# Patient Record
Sex: FEMALE | Race: ASIAN | URBAN - METROPOLITAN AREA
[De-identification: names, ages, dates, MRNs, and addresses within clinical notes are randomized per-mention and may not be internally consistent; named-entity substitution may affect disease eponyms.]

---

## 2023-03-28 ENCOUNTER — APPOINTMENT (RX ONLY)
Dept: URBAN - METROPOLITAN AREA CLINIC 329 | Facility: CLINIC | Age: 28
Setting detail: DERMATOLOGY
End: 2023-03-28

## 2023-03-28 DIAGNOSIS — L81.9 DISORDER OF PIGMENTATION, UNSPECIFIED: ICD-10-CM

## 2023-03-28 DIAGNOSIS — B08.1 MOLLUSCUM CONTAGIOSUM: ICD-10-CM

## 2023-03-28 PROCEDURE — ? PRESCRIPTION

## 2023-03-28 PROCEDURE — ? PRESCRIPTION MEDICATION MANAGEMENT

## 2023-03-28 PROCEDURE — ? MEDICATION COUNSELING

## 2023-03-28 PROCEDURE — ? ADDITIONAL NOTES

## 2023-03-28 PROCEDURE — 99203 OFFICE O/P NEW LOW 30 MIN: CPT

## 2023-03-28 PROCEDURE — ? COUNSELING

## 2023-03-28 RX ORDER — TRETIONIN 0.25 MG/G
CREAM TOPICAL
Qty: 45 | Refills: 0 | Status: ERX | COMMUNITY
Start: 2023-03-28

## 2023-03-28 RX ADMIN — TRETIONIN: 0.25 CREAM TOPICAL at 00:00

## 2023-03-28 ASSESSMENT — LOCATION DETAILED DESCRIPTION DERM
LOCATION DETAILED: RIGHT LATERAL INFERIOR EYELID
LOCATION DETAILED: LEFT INFERIOR CENTRAL MALAR CHEEK
LOCATION DETAILED: LEFT LATERAL INFERIOR EYELID

## 2023-03-28 ASSESSMENT — LOCATION ZONE DERM
LOCATION ZONE: EYELID
LOCATION ZONE: FACE

## 2023-03-28 ASSESSMENT — LOCATION SIMPLE DESCRIPTION DERM
LOCATION SIMPLE: LEFT INFERIOR EYELID
LOCATION SIMPLE: LEFT CHEEK
LOCATION SIMPLE: RIGHT INFERIOR EYELID

## 2023-03-28 NOTE — PROCEDURE: PRESCRIPTION MEDICATION MANAGEMENT
Initiate Treatment: Triamcinalone- apply a small amount to molluscum bumps daily. Be very careful around the eyes. Discontinue medication once the molluscum become irritated
Detail Level: Zone
Render In Strict Bullet Format?: No

## 2023-03-28 NOTE — PROCEDURE: MEDICATION COUNSELING
Xeltiaraz Pregnancy And Lactation Text: This medication is Pregnancy Category D and is not considered safe during pregnancy.  The risk during breast feeding is also uncertain.

## 2023-03-28 NOTE — PROCEDURE: MEDICATION COUNSELING
ambulate 150 feet with RW independently in 3 sessions Eucrisa Counseling: Patient may experience a mild burning sensation during topical application. Eucrisa is not approved in children less than 3 months of age.

## 2023-03-28 NOTE — PROCEDURE: ADDITIONAL NOTES
Additional Notes: Patient will try EltaMD sunscreen and vitamin C
Render Risk Assessment In Note?: no
Detail Level: Simple

## 2024-05-08 ENCOUNTER — TRANSCRIBE ORDERS (OUTPATIENT)
Dept: SCHEDULING | Age: 29
End: 2024-05-08

## 2024-05-08 ENCOUNTER — APPOINTMENT (OUTPATIENT)
Dept: ULTRASOUND IMAGING | Age: 29
End: 2024-05-08
Payer: COMMERCIAL

## 2024-05-08 ENCOUNTER — HOSPITAL ENCOUNTER (EMERGENCY)
Age: 29
Discharge: HOME | End: 2024-05-08
Payer: COMMERCIAL

## 2024-05-08 VITALS
SYSTOLIC BLOOD PRESSURE: 110 MMHG | WEIGHT: 174.3 LBS | RESPIRATION RATE: 16 BRPM | TEMPERATURE: 98.8 F | DIASTOLIC BLOOD PRESSURE: 80 MMHG | BODY MASS INDEX: 27.36 KG/M2 | HEART RATE: 86 BPM | OXYGEN SATURATION: 98 % | HEIGHT: 67 IN

## 2024-05-08 DIAGNOSIS — M79.605 LEFT LEG PAIN: Primary | ICD-10-CM

## 2024-05-08 PROCEDURE — 99284 EMERGENCY DEPT VISIT MOD MDM: CPT

## 2024-05-08 PROCEDURE — 93971 EXTREMITY STUDY: CPT

## 2024-05-08 ASSESSMENT — LIFESTYLE VARIABLES
HOW MANY STANDARD DRINKS CONTAINING ALCOHOL DO YOU HAVE ON A TYPICAL DAY: PATIENT DOES NOT DRINK
HOW OFTEN DO YOU HAVE A DRINK CONTAINING ALCOHOL: NEVER

## 2024-05-08 ASSESSMENT — ENCOUNTER SYMPTOMS
COUGH: 0
VOMITING: 0
ABDOMINAL PAIN: 0
PHOTOPHOBIA: 0
FACIAL SWELLING: 0
SHORTNESS OF BREATH: 0
TROUBLE SWALLOWING: 0

## 2024-05-08 ASSESSMENT — PAIN DESCRIPTION - ORIENTATION: ORIENTATION: LEFT

## 2024-05-08 ASSESSMENT — PAIN - FUNCTIONAL ASSESSMENT: PAIN_FUNCTIONAL_ASSESSMENT: 0-10

## 2024-05-08 ASSESSMENT — PAIN DESCRIPTION - LOCATION: LOCATION: LEG

## 2024-05-08 NOTE — ED PROVIDER NOTES
Ear: External ear normal.      Nose: Nose normal.      Mouth/Throat:      Mouth: Mucous membranes are moist.   Eyes:      General: No scleral icterus.        Right eye: No discharge.         Left eye: No discharge.      Conjunctiva/sclera: Conjunctivae normal.   Cardiovascular:      Rate and Rhythm: Normal rate and regular rhythm.      Pulses: Normal pulses.      Heart sounds: Normal heart sounds.   Pulmonary:      Effort: Pulmonary effort is normal. No respiratory distress.      Breath sounds: Normal breath sounds. No stridor. No wheezing, rhonchi or rales.   Abdominal:      General: There is no distension.      Palpations: There is no mass.      Tenderness: There is no abdominal tenderness. There is no right CVA tenderness, left CVA tenderness, guarding or rebound.      Hernia: No hernia is present.      Comments: Gravid uterus.  No abdominal tenderness   Musculoskeletal:         General: Normal range of motion.      Cervical back: Normal range of motion and neck supple.      Comments: Trace left lower extremity edema compared with the right.  Tenderness in the calf belly of the left lower extremity.  Bilateral 2+ dorsalis pedis pulse in bilateral lower extremities.  Full range of motion of all joints of the left lower extremity including hip, knee, ankle.  No erythema or warmth.  Negative Homans' sign.  No palpable cords.  Ambulatory   Skin:     General: Skin is warm and dry.      Capillary Refill: Capillary refill takes less than 2 seconds.      Coloration: Skin is not jaundiced.   Neurological:      General: No focal deficit present.      Mental Status: She is alert. Mental status is at baseline.        Procedures     Procedures    Orders Placed This Encounter   Procedures    Vascular duplex lower extremity venous left        Medications given during this emergency department visit:  Medications - No data to display    New Prescriptions    No medications on file        History reviewed. No pertinent past

## 2024-05-08 NOTE — ED TRIAGE NOTES
Patient to triage with c/o left sided calf pain. Pt was sent by OBGYN for possible blood clot. Pt is currently 38 weeks pregnant.

## 2024-05-08 NOTE — DISCHARGE INSTRUCTIONS
Your ultrasound was negative today.  Please call your OB/GYN to discuss further management.    As we discussed, I did not find a life threatening cause of your symptoms today. However, THAT DOES NOT MEAN IT COULD NOT DEVELOP. If you develop ANY new or worsening symptoms, it is critical that you return for re-evaluation. This includes any symptoms that are concerning to you, especially symptoms such as inability to bear weight, fevers, redness.  If you do not return for re-evaluation, you risk serious complications, including death.

## 2024-05-10 ENCOUNTER — HOSPITAL ENCOUNTER (OUTPATIENT)
Age: 29
Discharge: HOME | End: 2024-05-11
Attending: OBSTETRICS & GYNECOLOGY | Admitting: OBSTETRICS & GYNECOLOGY
Payer: COMMERCIAL

## 2024-05-11 VITALS
TEMPERATURE: 97.2 F | OXYGEN SATURATION: 99 % | HEART RATE: 90 BPM | DIASTOLIC BLOOD PRESSURE: 71 MMHG | RESPIRATION RATE: 17 BRPM | SYSTOLIC BLOOD PRESSURE: 108 MMHG

## 2024-05-11 PROCEDURE — 6370000000 HC RX 637 (ALT 250 FOR IP): Performed by: OBSTETRICS & GYNECOLOGY

## 2024-05-11 PROCEDURE — 99285 EMERGENCY DEPT VISIT HI MDM: CPT

## 2024-05-11 RX ORDER — CYCLOBENZAPRINE HCL 10 MG
10 TABLET ORAL 2 TIMES DAILY PRN
Qty: 6 TABLET | Refills: 0 | Status: ON HOLD | OUTPATIENT
Start: 2024-05-11

## 2024-05-11 RX ORDER — CYCLOBENZAPRINE HCL 10 MG
10 TABLET ORAL 3 TIMES DAILY PRN
Status: DISCONTINUED | OUTPATIENT
Start: 2024-05-11 | End: 2024-05-11 | Stop reason: HOSPADM

## 2024-05-11 RX ADMIN — CYCLOBENZAPRINE 10 MG: 10 TABLET, FILM COATED ORAL at 02:10

## 2024-05-11 NOTE — PROGRESS NOTES
Pt to L&D with reports of ctx and back pain. This began at yesterday. Pt denies vaginal bleeding, loss of fluid, H/A, and blurry vision. Pt reports good fetal movement. EFM and TOCO applied to a soft, non-tender abd. MD notified.

## 2024-05-11 NOTE — DISCHARGE INSTRUCTIONS
make sure to drink plenty of water.  Follow-up care is a key part of your treatment and safety. Be sure to make and go to all appointments, and call your doctor if you are having problems. It's also a good idea to know your test results and keep a list of the medicines you take.  How can you care for yourself at home?  Make sure you go to your prenatal appointments. At each visit, your doctor will check your blood pressure and weight. Your doctor will also listen for a fetal heartbeat and measure the size of the uterus.  Drink plenty of fluids. Dehydration can cause contractions. If you have kidney, heart, or liver disease and have to limit fluids, talk with your doctor before you increase the amount of fluids you drink.  Tell your doctor right away if you notice any symptoms of an infection, such as:  Burning when you urinate.  A frequent need to urinate without being able to pass much urine.  A foul-smelling discharge from your vagina.  Vaginal itching.  Unexplained fever.  Unusual pain or soreness in your uterus or lower belly.  Avoid foods that may be harmful.  Don't eat raw meat, deli meat, raw seafood, or raw eggs.  Avoid soft cheese and unpasteurized dairy, like Brie and blue cheese.  Avoid fish that are high in mercury. These include shark, swordfish, kenrick mackerel, marlin, orange roughy, and bigeye tuna, as well as tilefish from the [place].  If you smoke or vape, quit or cut back as much as you can. Talk to your doctor if you need help quitting.  If you use alcohol, marijuana, or other drugs, quit or cut back as much as you can. It's safest not to use them at all. Talk to your doctor if you need help quitting.  Follow your doctor's directions about activity. Your doctor will let you know how much exercise you can do.  Ask your doctor if you can have sex. If you are at risk for early labor, your doctor may ask you to not have sex.  Take care to avoid falling. Changes in your body during pregnancy,  license by [employer]. If you have questions about a medical condition or this instruction, always ask your healthcare professional. [employer] disclaims any warranty or liability for your use of this information.

## 2024-05-11 NOTE — PROGRESS NOTES
Discharge orders received from MD. Discharge paperwork printed and gone over with pt. All questions answered at this time. EFM and TOCO removed from soft, non-tender abd. Pt ambulated off of unit w/ no difficulty.

## 2024-05-11 NOTE — H&P
History & Physical    Name: Nancy Puga MRN: [id number]  SSN: [id number]    YOB: 1995  Age: 28 y.o.  Sex: female      Subjective:     Reason for Triage visit:  38w3d and contractions    History of Present Illness: Ms. Puga is a 28 y.o.  female with an estimated gestational age of 38w3d with Estimated Date of Delivery: 24. Patient states that she seems to be having back labor. She isn't hurting in her stomach but is having cramping in her back intermittently.  She has episodes of being pain free between contractions.  No VB, no LOF. Good FM. Pregnancy has been  uncomplicated.  Patient denies chest pain, fever, headache , nausea and vomiting, pelvic pressure, right upper quadrant pain  , shortness of breath, swelling, vaginal bleeding , vaginal leaking of fluid , and visual disturbances.    OB History    Para Term  AB Living   1             SAB IAB Ectopic Molar Multiple Live Births                    # Outcome Date GA Lbr James/2nd Weight Sex Delivery Anes PTL Lv   1 Current              History reviewed. No pertinent past medical history.  History reviewed. No pertinent surgical history.  Social History     Occupational History    Not on file   Tobacco Use    Smoking status: Never    Smokeless tobacco: Never   Substance and Sexual Activity    Alcohol use: Not on file    Drug use: Not on file    Sexual activity: Not on file      History reviewed. No pertinent family history.    No Known Allergies  Prior to Admission medications    Not on File        Review of Systems:  Complete review of systems performed.  Those not specifically mentioned in the HPI are either negative are non related to this patient encounter.    Objective:     Vitals:    Vitals:    24 0012   BP: 108/71   Pulse: 90   Resp: 17   Temp: 97.2 °F (36.2 °C)   TempSrc: Oral   SpO2: 99%      Temp (24hrs), Av.2 °F (36.2 °C), Min:97.2 °F (36.2 °C), Max:97.2 °F (36.2 °C)    BP  Min: 108/71  Max: 108/71        Physical Exam:  Heart: RRR  Lungs: cta bl  Adb: soft, nt nd, gravid  Ext: no edema noted  CVX: 2/80/-2 ( unchanged after 1.5 hours)  Membranes:  Intact  Uterine Activity:  Frequency: Every 3-7 minutes   Fetal Heart Rate:  Reactive         Assessment and Plan:   38w3d with initial complaints of contractions but cervix unchanged after walking for over 1 hour.  Likely latent labor.  Will provide flexeril for pain control.  Precautions for active labor given      Precautions given.  Patient was told to return to LD immediately if she experiences contractions in a pattern, loss of fluids, vaginal bleeding or decreased Fetal movement.      All lab results for the last 24 hours reviewed.

## 2024-05-11 NOTE — PROGRESS NOTES
Verbal orders from MD to allow pt to walk for an hour and then recheck SVE. Pt removed from monitor at this time.

## 2024-05-12 ENCOUNTER — ANESTHESIA EVENT (OUTPATIENT)
Dept: LABOR AND DELIVERY | Age: 29
End: 2024-05-12
Payer: COMMERCIAL

## 2024-05-12 ENCOUNTER — HOSPITAL ENCOUNTER (INPATIENT)
Age: 29
LOS: 3 days | Discharge: HOME | End: 2024-05-15
Attending: OBSTETRICS & GYNECOLOGY | Admitting: OBSTETRICS & GYNECOLOGY
Payer: COMMERCIAL

## 2024-05-12 ENCOUNTER — ANESTHESIA (OUTPATIENT)
Dept: LABOR AND DELIVERY | Age: 29
End: 2024-05-12
Payer: COMMERCIAL

## 2024-05-12 DIAGNOSIS — R52 POSTPARTUM PAIN: Primary | ICD-10-CM

## 2024-05-12 PROBLEM — Z37.9 NORMAL LABOR: Status: ACTIVE | Noted: 2024-05-12

## 2024-05-12 LAB
ABO + RH BLD: NORMAL
BLOOD GROUP ANTIBODIES SERPL: NORMAL
ERYTHROCYTE [DISTWIDTH] IN BLOOD BY AUTOMATED COUNT: 15 % (ref 11.9–14.6)
HCT VFR BLD AUTO: 42.6 % (ref 35.8–46.3)
HGB BLD-MCNC: 14.2 G/DL (ref 11.7–15.4)
MCH RBC QN AUTO: 28.7 PG (ref 26.1–32.9)
MCHC RBC AUTO-ENTMCNC: 33.3 G/DL (ref 31.4–35)
MCV RBC AUTO: 86.1 FL (ref 82–102)
NRBC # BLD: 0 K/UL (ref 0–0.2)
PLATELET # BLD AUTO: 243 K/UL (ref 150–450)
PMV BLD AUTO: 10.1 FL (ref 9.4–12.3)
RBC # BLD AUTO: 4.95 M/UL (ref 4.05–5.2)
SPECIMEN EXP DATE BLD: NORMAL
T PALLIDUM AB SER QL IA: NONREACTIVE
WBC # BLD AUTO: 12.2 K/UL (ref 4.3–11.1)

## 2024-05-12 PROCEDURE — 2500000003 HC RX 250 WO HCPCS: Performed by: NURSE ANESTHETIST, CERTIFIED REGISTERED

## 2024-05-12 PROCEDURE — 85027 COMPLETE CBC AUTOMATED: CPT

## 2024-05-12 PROCEDURE — 51701 INSERT BLADDER CATHETER: CPT

## 2024-05-12 PROCEDURE — 00HU33Z INSERTION OF INFUSION DEVICE INTO SPINAL CANAL, PERCUTANEOUS APPROACH: ICD-10-PCS | Performed by: ANESTHESIOLOGY

## 2024-05-12 PROCEDURE — 6360000002 HC RX W HCPCS: Performed by: ANESTHESIOLOGY

## 2024-05-12 PROCEDURE — 2580000003 HC RX 258: Performed by: OBSTETRICS & GYNECOLOGY

## 2024-05-12 PROCEDURE — 6360000002 HC RX W HCPCS: Performed by: OBSTETRICS & GYNECOLOGY

## 2024-05-12 PROCEDURE — 1100000000 HC RM PRIVATE

## 2024-05-12 PROCEDURE — 86780 TREPONEMA PALLIDUM: CPT

## 2024-05-12 PROCEDURE — 4A1HXCZ MONITORING OF PRODUCTS OF CONCEPTION, CARDIAC RATE, EXTERNAL APPROACH: ICD-10-PCS | Performed by: OBSTETRICS & GYNECOLOGY

## 2024-05-12 PROCEDURE — 3700000025 EPIDURAL BLOCK: Performed by: ANESTHESIOLOGY

## 2024-05-12 PROCEDURE — 86850 RBC ANTIBODY SCREEN: CPT

## 2024-05-12 PROCEDURE — 86900 BLOOD TYPING SEROLOGIC ABO: CPT

## 2024-05-12 PROCEDURE — 99283 EMERGENCY DEPT VISIT LOW MDM: CPT

## 2024-05-12 PROCEDURE — 2500000003 HC RX 250 WO HCPCS: Performed by: ANESTHESIOLOGY

## 2024-05-12 PROCEDURE — 36415 COLL VENOUS BLD VENIPUNCTURE: CPT

## 2024-05-12 PROCEDURE — 86901 BLOOD TYPING SEROLOGIC RH(D): CPT

## 2024-05-12 RX ORDER — CARBOPROST TROMETHAMINE 250 UG/ML
250 INJECTION, SOLUTION INTRAMUSCULAR PRN
Status: DISCONTINUED | OUTPATIENT
Start: 2024-05-12 | End: 2024-05-15 | Stop reason: HOSPADM

## 2024-05-12 RX ORDER — METHYLERGONOVINE MALEATE 0.2 MG/ML
200 INJECTION INTRAVENOUS PRN
Status: DISCONTINUED | OUTPATIENT
Start: 2024-05-12 | End: 2024-05-15 | Stop reason: HOSPADM

## 2024-05-12 RX ORDER — ALBUTEROL SULFATE 0.63 MG/3ML
1 SOLUTION RESPIRATORY (INHALATION) EVERY 6 HOURS PRN
COMMUNITY

## 2024-05-12 RX ORDER — SODIUM CHLORIDE, SODIUM LACTATE, POTASSIUM CHLORIDE, AND CALCIUM CHLORIDE .6; .31; .03; .02 G/100ML; G/100ML; G/100ML; G/100ML
500 INJECTION, SOLUTION INTRAVENOUS PRN
Status: DISCONTINUED | OUTPATIENT
Start: 2024-05-12 | End: 2024-05-13

## 2024-05-12 RX ORDER — ACETAMINOPHEN 325 MG/1
650 TABLET ORAL EVERY 4 HOURS PRN
Status: DISCONTINUED | OUTPATIENT
Start: 2024-05-12 | End: 2024-05-13

## 2024-05-12 RX ORDER — LIDOCAINE HYDROCHLORIDE AND EPINEPHRINE 15; 5 MG/ML; UG/ML
INJECTION, SOLUTION EPIDURAL PRN
Status: DISCONTINUED | OUTPATIENT
Start: 2024-05-12 | End: 2024-05-13 | Stop reason: SDUPTHER

## 2024-05-12 RX ORDER — FAMOTIDINE 20 MG/1
20 TABLET, FILM COATED ORAL ONCE
Status: DISCONTINUED | OUTPATIENT
Start: 2024-05-12 | End: 2024-05-13

## 2024-05-12 RX ORDER — TERBUTALINE SULFATE 1 MG/ML
0.25 INJECTION, SOLUTION SUBCUTANEOUS
Status: DISCONTINUED | OUTPATIENT
Start: 2024-05-12 | End: 2024-05-13

## 2024-05-12 RX ORDER — CARBOPROST TROMETHAMINE 250 UG/ML
250 INJECTION, SOLUTION INTRAMUSCULAR PRN
Status: CANCELLED | OUTPATIENT
Start: 2024-05-12

## 2024-05-12 RX ORDER — MISOPROSTOL 200 UG/1
400 TABLET ORAL PRN
Status: DISCONTINUED | OUTPATIENT
Start: 2024-05-12 | End: 2024-05-15 | Stop reason: HOSPADM

## 2024-05-12 RX ORDER — TRANEXAMIC ACID 10 MG/ML
1000 INJECTION, SOLUTION INTRAVENOUS
Status: ACTIVE | OUTPATIENT
Start: 2024-05-12 | End: 2024-05-13

## 2024-05-12 RX ORDER — SODIUM CHLORIDE 0.9 % (FLUSH) 0.9 %
5-40 SYRINGE (ML) INJECTION PRN
Status: DISCONTINUED | OUTPATIENT
Start: 2024-05-12 | End: 2024-05-13

## 2024-05-12 RX ORDER — ONDANSETRON 4 MG/1
4 TABLET, ORALLY DISINTEGRATING ORAL EVERY 6 HOURS PRN
Status: DISCONTINUED | OUTPATIENT
Start: 2024-05-12 | End: 2024-05-13

## 2024-05-12 RX ORDER — ACETAMINOPHEN 325 MG/1
650 TABLET ORAL EVERY 4 HOURS PRN
Status: CANCELLED | OUTPATIENT
Start: 2024-05-12

## 2024-05-12 RX ORDER — EPHEDRINE SULFATE/0.9% NACL/PF 50 MG/5 ML
SYRINGE (ML) INTRAVENOUS PRN
Status: DISCONTINUED | OUTPATIENT
Start: 2024-05-12 | End: 2024-05-13 | Stop reason: SDUPTHER

## 2024-05-12 RX ORDER — ONDANSETRON 4 MG/1
4 TABLET, ORALLY DISINTEGRATING ORAL EVERY 6 HOURS PRN
Status: CANCELLED | OUTPATIENT
Start: 2024-05-12

## 2024-05-12 RX ORDER — SODIUM CHLORIDE 0.9 % (FLUSH) 0.9 %
5-40 SYRINGE (ML) INJECTION PRN
Status: CANCELLED | OUTPATIENT
Start: 2024-05-12

## 2024-05-12 RX ORDER — SODIUM CHLORIDE 9 MG/ML
25 INJECTION, SOLUTION INTRAVENOUS PRN
Status: DISCONTINUED | OUTPATIENT
Start: 2024-05-12 | End: 2024-05-13

## 2024-05-12 RX ORDER — SODIUM CHLORIDE 0.9 % (FLUSH) 0.9 %
5-40 SYRINGE (ML) INJECTION EVERY 12 HOURS SCHEDULED
Status: CANCELLED | OUTPATIENT
Start: 2024-05-12

## 2024-05-12 RX ORDER — SODIUM CHLORIDE 9 MG/ML
25 INJECTION, SOLUTION INTRAVENOUS PRN
Status: CANCELLED | OUTPATIENT
Start: 2024-05-12

## 2024-05-12 RX ORDER — ONDANSETRON 2 MG/ML
4 INJECTION INTRAMUSCULAR; INTRAVENOUS EVERY 6 HOURS PRN
Status: DISCONTINUED | OUTPATIENT
Start: 2024-05-12 | End: 2024-05-13

## 2024-05-12 RX ORDER — MISOPROSTOL 200 UG/1
400 TABLET ORAL PRN
Status: CANCELLED | OUTPATIENT
Start: 2024-05-12

## 2024-05-12 RX ORDER — SODIUM CHLORIDE, SODIUM LACTATE, POTASSIUM CHLORIDE, CALCIUM CHLORIDE 600; 310; 30; 20 MG/100ML; MG/100ML; MG/100ML; MG/100ML
INJECTION, SOLUTION INTRAVENOUS CONTINUOUS
Status: CANCELLED | OUTPATIENT
Start: 2024-05-12

## 2024-05-12 RX ORDER — ROPIVACAINE HYDROCHLORIDE 2 MG/ML
INJECTION, SOLUTION EPIDURAL; INFILTRATION; PERINEURAL PRN
Status: DISCONTINUED | OUTPATIENT
Start: 2024-05-12 | End: 2024-05-13 | Stop reason: SDUPTHER

## 2024-05-12 RX ORDER — SODIUM CHLORIDE 0.9 % (FLUSH) 0.9 %
5-40 SYRINGE (ML) INJECTION EVERY 12 HOURS SCHEDULED
Status: DISCONTINUED | OUTPATIENT
Start: 2024-05-12 | End: 2024-05-13

## 2024-05-12 RX ORDER — METHYLERGONOVINE MALEATE 0.2 MG/ML
200 INJECTION INTRAVENOUS PRN
Status: CANCELLED | OUTPATIENT
Start: 2024-05-12

## 2024-05-12 RX ORDER — SODIUM CHLORIDE, SODIUM LACTATE, POTASSIUM CHLORIDE, AND CALCIUM CHLORIDE .6; .31; .03; .02 G/100ML; G/100ML; G/100ML; G/100ML
500 INJECTION, SOLUTION INTRAVENOUS PRN
Status: CANCELLED | OUTPATIENT
Start: 2024-05-12

## 2024-05-12 RX ORDER — SODIUM CHLORIDE, SODIUM LACTATE, POTASSIUM CHLORIDE, AND CALCIUM CHLORIDE .6; .31; .03; .02 G/100ML; G/100ML; G/100ML; G/100ML
1000 INJECTION, SOLUTION INTRAVENOUS PRN
Status: DISCONTINUED | OUTPATIENT
Start: 2024-05-12 | End: 2024-05-13

## 2024-05-12 RX ORDER — SODIUM CHLORIDE, SODIUM LACTATE, POTASSIUM CHLORIDE, CALCIUM CHLORIDE 600; 310; 30; 20 MG/100ML; MG/100ML; MG/100ML; MG/100ML
INJECTION, SOLUTION INTRAVENOUS CONTINUOUS
Status: DISCONTINUED | OUTPATIENT
Start: 2024-05-12 | End: 2024-05-13

## 2024-05-12 RX ORDER — LIDOCAINE HYDROCHLORIDE 10 MG/ML
1 INJECTION, SOLUTION INFILTRATION; PERINEURAL
Status: DISCONTINUED | OUTPATIENT
Start: 2024-05-12 | End: 2024-05-13

## 2024-05-12 RX ORDER — TERBUTALINE SULFATE 1 MG/ML
0.25 INJECTION, SOLUTION SUBCUTANEOUS
Status: CANCELLED | OUTPATIENT
Start: 2024-05-12 | End: 2024-05-13

## 2024-05-12 RX ORDER — SODIUM CHLORIDE, SODIUM LACTATE, POTASSIUM CHLORIDE, AND CALCIUM CHLORIDE .6; .31; .03; .02 G/100ML; G/100ML; G/100ML; G/100ML
1000 INJECTION, SOLUTION INTRAVENOUS PRN
Status: CANCELLED | OUTPATIENT
Start: 2024-05-12

## 2024-05-12 RX ORDER — ONDANSETRON 2 MG/ML
4 INJECTION INTRAMUSCULAR; INTRAVENOUS EVERY 6 HOURS PRN
Status: CANCELLED | OUTPATIENT
Start: 2024-05-12

## 2024-05-12 RX ORDER — SODIUM CHLORIDE 9 MG/ML
INJECTION, SOLUTION INTRAVENOUS PRN
Status: DISCONTINUED | OUTPATIENT
Start: 2024-05-12 | End: 2024-05-13

## 2024-05-12 RX ORDER — TRANEXAMIC ACID 10 MG/ML
1000 INJECTION, SOLUTION INTRAVENOUS
Status: CANCELLED | OUTPATIENT
Start: 2024-05-12 | End: 2024-05-13

## 2024-05-12 RX ADMIN — Medication 15 MG: at 16:30

## 2024-05-12 RX ADMIN — ROPIVACAINE HYDROCHLORIDE 10 ML/HR: 2 INJECTION, SOLUTION EPIDURAL; INFILTRATION at 16:13

## 2024-05-12 RX ADMIN — ROPIVACAINE HYDROCHLORIDE 4 ML: 2 INJECTION, SOLUTION EPIDURAL; INFILTRATION at 16:12

## 2024-05-12 RX ADMIN — OXYTOCIN 1 MILLI-UNITS/MIN: 10 INJECTION INTRAVENOUS at 20:25

## 2024-05-12 RX ADMIN — SODIUM CHLORIDE, POTASSIUM CHLORIDE, SODIUM LACTATE AND CALCIUM CHLORIDE: 600; 310; 30; 20 INJECTION, SOLUTION INTRAVENOUS at 22:42

## 2024-05-12 RX ADMIN — ROPIVACAINE HYDROCHLORIDE 4 ML: 2 INJECTION, SOLUTION EPIDURAL; INFILTRATION at 16:10

## 2024-05-12 RX ADMIN — LIDOCAINE HYDROCHLORIDE,EPINEPHRINE BITARTRATE 3.5 ML: 15; .005 INJECTION, SOLUTION EPIDURAL; INFILTRATION; INTRACAUDAL; PERINEURAL at 16:08

## 2024-05-12 NOTE — PROGRESS NOTES
EPIDURAL PLACEMENT      Dr Rivera at bedside at 1600.      Assisted pt to sitting up on bedside at 1600.    Timeout completed at 1602 with MD, CRNA and myself at bedside.    Test dose given at 1608.  Negative reaction.    Dose given at 1613.    Pt assisted to lying back in left tilt position.     See anesthesia record for details.  See vital sign flow sheet for BP.    Tolerated procedure well.

## 2024-05-12 NOTE — PROGRESS NOTES
Pt sleeping,  Patient Vitals for the past 24 hrs:   BP Temp Temp src Pulse Resp SpO2   24 1725 105/70 -- -- (!) 123 -- --   24 1710 104/68 -- -- (!) 107 -- --   24 1655 112/70 -- -- (!) 108 -- --   24 1640 108/72 -- -- (!) 117 -- --   24 1615 113/64 -- -- (!) 120 -- --   24 1614 103/69 -- -- (!) 118 -- --   24 1613 121/77 -- -- (!) 111 -- --   24 1612 113/67 -- -- (!) 113 -- --   24 1610 114/77 -- -- 100 -- --   24 1609 109/73 -- -- 100 -- --   24 1541 123/82 97.4 °F (36.3 °C) Oral (!) 103 17 99 %      Cx 7-8/C/-1-2  Cat 1 tracing  SROM with exam    A/P- continue current care, plan for

## 2024-05-12 NOTE — ANESTHESIA PROCEDURE NOTES
Epidural Block    Patient location during procedure: OB  Start time: 5/12/2024 4:02 PM  End time: 5/12/2024 4:10 PM  Reason for block: labor epidural  Staffing  Performed: anesthesiologist   Anesthesiologist: CHINO Childress MD  Performed by: CHINO Childress MD  Authorized by: CHINO Childress MD    Epidural  Patient position: sitting  Prep: ChloraPrep and site prepped and draped  Patient monitoring: continuous pulse ox and frequent blood pressure checks  Approach: midline  Location: L3-4  Injection technique: MARYANNE saline  Provider prep: mask and sterile gloves  Needle  Needle type: Tuohy   Needle gauge: 17 G  Needle length: 3.5 in  Needle insertion depth: 6 cm  Catheter type: end hole  Catheter size: 19 G  Catheter at skin depth: 12 cm  Test dose: negativeCatheter Secured: tegaderm and tape  Assessment  Hemodynamics: stable  Attempts: 1  Outcomes: patient tolerated procedure well  Additional Notes  Test dose with Lido 1.5% with epi - 3.5 ml  Preanesthetic Checklist  Completed: patient identified, IV checked, risks and benefits discussed, equipment checked, pre-op evaluation, timeout performed, anesthesia consent given, oxygen available and monitors applied/VS acknowledged          
Daily Assessment

## 2024-05-12 NOTE — ANESTHESIA PRE PROCEDURE
Department of Anesthesiology  Preprocedure Note       Name:  Nancy Puga   Age:  28 y.o.  :  1995                                          MRN:  [id number]         Date:  2024      Surgeon: * No surgeons listed *    Procedure: * No procedures listed *    Medications prior to admission:   Prior to Admission medications    Medication Sig Start Date End Date Taking? Authorizing Provider   cyclobenzaprine (FLEXERIL) 10 MG tablet Take 1 tablet by mouth 2 times daily as needed for Muscle spasms 24   Anastacio Salvador MD       Current medications:    Current Facility-Administered Medications   Medication Dose Route Frequency Provider Last Rate Last Admin   • terbutaline (BRETHINE) injection 0.25 mg  0.25 mg SubCUTAneous Once PRN Anastacio Salvador MD       • lactated ringers bolus 500 mL  500 mL IntraVENous PRN Anastacio Salvador MD        Or   • lactated ringers bolus 1,000 mL  1,000 mL IntraVENous PRN Anastacio Salvador MD       • sodium chloride flush 0.9 % injection 5-40 mL  5-40 mL IntraVENous 2 times per day Anastacio Salvador MD       • sodium chloride flush 0.9 % injection 5-40 mL  5-40 mL IntraVENous PRN Anastacio Salvador MD       • 0.9 % sodium chloride infusion  25 mL IntraVENous PRN Anastacio Salvador MD       • acetaminophen (TYLENOL) tablet 650 mg  650 mg Oral Q4H PRN Anastacio Salvador MD       • lidocaine 1 % injection 1 mL  1 mL IntraDERmal Once PRN CHINO Childress MD       • famotidine (PEPCID) tablet 20 mg  20 mg Oral Once CHINO Childress MD       • sodium chloride flush 0.9 % injection 5-40 mL  5-40 mL IntraVENous 2 times per day CHINO Childress MD       • sodium chloride flush 0.9 % injection 5-40 mL  5-40 mL IntraVENous PRN CHINO Childress MD       • 0.9 % sodium chloride infusion   IntraVENous PRN CHINO Childress MD           Allergies:  No Known Allergies    Problem List:    Patient Active Problem List   Diagnosis Code   • Normal labor O80, Z37.9       Past Medical History:  No past

## 2024-05-12 NOTE — H&P
History & Physical    Name: Nancy Puga MRN: [id number]  SSN: [id number]    YOB: 1995  Age: 28 y.o.  Sex: female      Subjective:     Reason for Triage visit:  38w4d and contractions    History of Present Illness: Ms. Puga is a 28 y.o.  female with an estimated gestational age of 38w4d with Estimated Date of Delivery: 24. Patient states that she was here Friday night checked twice and remained 2 cm.  This am, contractions more intense on arrival was found to be 6 cm.  Patient denies chest pain, fever, headache , nausea and vomiting, right upper quadrant pain  , shortness of breath, swelling, vaginal bleeding , vaginal leaking of fluid , and visual disturbances.    OB History    Para Term  AB Living   1             SAB IAB Ectopic Molar Multiple Live Births                    # Outcome Date GA Lbr James/2nd Weight Sex Delivery Anes PTL Lv   1 Current              No past medical history on file.  No past surgical history on file.  Social History     Occupational History    Not on file   Tobacco Use    Smoking status: Never    Smokeless tobacco: Never   Substance and Sexual Activity    Alcohol use: Not on file    Drug use: Not on file    Sexual activity: Not on file      No family history on file.    No Known Allergies  Prior to Admission medications    Medication Sig Start Date End Date Taking? Authorizing Provider   cyclobenzaprine (FLEXERIL) 10 MG tablet Take 1 tablet by mouth 2 times daily as needed for Muscle spasms 24   Anastacio Salvador MD        Review of Systems:  Complete review of systems performed.  Those not specifically mentioned in the HPI are either negative are non related to this patient encounter.    Objective:     Vitals:    Vitals:    24 1541   BP: 123/82   Pulse: (!) 103   Resp: 17   Temp: 97.4 °F (36.3 °C)   TempSrc: Oral   SpO2: 99%      Temp (24hrs), Av.4 °F (36.3 °C), Min:97.4 °F (36.3 °C), Max:97.4 °F (36.3 °C)    BP  Min: 123/82

## 2024-05-13 LAB
ARTERIAL PATENCY WRIST A: ABNORMAL
BASE DEFICIT BLD-SCNC: 5.4 MMOL/L
BASE DEFICIT BLD-SCNC: 6 MMOL/L
ERYTHROCYTE [DISTWIDTH] IN BLOOD BY AUTOMATED COUNT: 15.4 % (ref 11.9–14.6)
GAS FLOW.O2 O2 DELIVERY SYS: ABNORMAL
GAS FLOW.O2 O2 DELIVERY SYS: ABNORMAL
HCO3 BLD-SCNC: 21.7 MMOL/L (ref 22–26)
HCO3 BLDV-SCNC: 20.1 MMOL/L (ref 23–28)
HCT VFR BLD AUTO: 35.5 % (ref 35.8–46.3)
HGB BLD-MCNC: 11.6 G/DL (ref 11.7–15.4)
MCH RBC QN AUTO: 28.9 PG (ref 26.1–32.9)
MCHC RBC AUTO-ENTMCNC: 32.7 G/DL (ref 31.4–35)
MCV RBC AUTO: 88.5 FL (ref 82–102)
NRBC # BLD: 0 K/UL (ref 0–0.2)
PCO2 BLDCO: 38 MMHG (ref 32–68)
PCO2 BLDCO: 51 MMHG (ref 32–68)
PH BLDCO: 7.24 (ref 7.15–7.38)
PH BLDCO: 7.33 (ref 7.15–7.38)
PLATELET # BLD AUTO: 204 K/UL (ref 150–450)
PMV BLD AUTO: 10 FL (ref 9.4–12.3)
PO2 BLDCO: 26 MMHG
PO2 BLDCO: 30 MMHG
RBC # BLD AUTO: 4.01 M/UL (ref 4.05–5.2)
SAO2 % BLD: 37.7 % (ref 95–98)
SAO2 % BLDV: 53.3 % (ref 65–88)
SERVICE CMNT-IMP: ABNORMAL
SERVICE CMNT-IMP: ABNORMAL
SPECIMEN TYPE: ABNORMAL
SPECIMEN TYPE: ABNORMAL
WBC # BLD AUTO: 12.8 K/UL (ref 4.3–11.1)

## 2024-05-13 PROCEDURE — 2580000003 HC RX 258: Performed by: OBSTETRICS & GYNECOLOGY

## 2024-05-13 PROCEDURE — 36415 COLL VENOUS BLD VENIPUNCTURE: CPT

## 2024-05-13 PROCEDURE — 0KQM0ZZ REPAIR PERINEUM MUSCLE, OPEN APPROACH: ICD-10-PCS | Performed by: OBSTETRICS & GYNECOLOGY

## 2024-05-13 PROCEDURE — 6370000000 HC RX 637 (ALT 250 FOR IP)

## 2024-05-13 PROCEDURE — 6370000000 HC RX 637 (ALT 250 FOR IP): Performed by: OBSTETRICS & GYNECOLOGY

## 2024-05-13 PROCEDURE — 6360000002 HC RX W HCPCS: Performed by: OBSTETRICS & GYNECOLOGY

## 2024-05-13 PROCEDURE — 51701 INSERT BLADDER CATHETER: CPT

## 2024-05-13 PROCEDURE — 10907ZC DRAINAGE OF AMNIOTIC FLUID, THERAPEUTIC FROM PRODUCTS OF CONCEPTION, VIA NATURAL OR ARTIFICIAL OPENING: ICD-10-PCS | Performed by: OBSTETRICS & GYNECOLOGY

## 2024-05-13 PROCEDURE — 1100000000 HC RM PRIVATE

## 2024-05-13 PROCEDURE — 85027 COMPLETE CBC AUTOMATED: CPT

## 2024-05-13 PROCEDURE — 7100000010 HC PHASE II RECOVERY - FIRST 15 MIN

## 2024-05-13 PROCEDURE — 36600 WITHDRAWAL OF ARTERIAL BLOOD: CPT

## 2024-05-13 PROCEDURE — 82803 BLOOD GASES ANY COMBINATION: CPT

## 2024-05-13 PROCEDURE — 7210000100 HC LABOR FEE PER 1 HR

## 2024-05-13 PROCEDURE — 7220000101 HC DELIVERY VAGINAL/SINGLE

## 2024-05-13 PROCEDURE — 7100000011 HC PHASE II RECOVERY - ADDTL 15 MIN

## 2024-05-13 RX ORDER — OXYCODONE HYDROCHLORIDE 5 MG/1
10 TABLET ORAL EVERY 4 HOURS PRN
Status: DISCONTINUED | OUTPATIENT
Start: 2024-05-13 | End: 2024-05-15 | Stop reason: HOSPADM

## 2024-05-13 RX ORDER — IBUPROFEN 600 MG/1
600 TABLET ORAL EVERY 6 HOURS PRN
Status: DISCONTINUED | OUTPATIENT
Start: 2024-05-13 | End: 2024-05-15 | Stop reason: HOSPADM

## 2024-05-13 RX ORDER — ONDANSETRON 2 MG/ML
4 INJECTION INTRAMUSCULAR; INTRAVENOUS EVERY 6 HOURS PRN
Status: DISCONTINUED | OUTPATIENT
Start: 2024-05-13 | End: 2024-05-15 | Stop reason: HOSPADM

## 2024-05-13 RX ORDER — ACETAMINOPHEN 500 MG
1000 TABLET ORAL EVERY 6 HOURS SCHEDULED
Status: DISCONTINUED | OUTPATIENT
Start: 2024-05-13 | End: 2024-05-15 | Stop reason: HOSPADM

## 2024-05-13 RX ORDER — ACETAMINOPHEN 500 MG
1000 TABLET ORAL EVERY 6 HOURS SCHEDULED
Status: DISCONTINUED | OUTPATIENT
Start: 2024-05-13 | End: 2024-05-13 | Stop reason: SDUPTHER

## 2024-05-13 RX ORDER — SODIUM CHLORIDE, SODIUM LACTATE, POTASSIUM CHLORIDE, CALCIUM CHLORIDE 600; 310; 30; 20 MG/100ML; MG/100ML; MG/100ML; MG/100ML
INJECTION, SOLUTION INTRAVENOUS CONTINUOUS
Status: DISCONTINUED | OUTPATIENT
Start: 2024-05-13 | End: 2024-05-15 | Stop reason: HOSPADM

## 2024-05-13 RX ORDER — MAGNESIUM CARB/ALUMINUM HYDROX 105-160MG
TABLET,CHEWABLE ORAL
Status: COMPLETED
Start: 2024-05-13 | End: 2024-05-13

## 2024-05-13 RX ORDER — SODIUM CHLORIDE 0.9 % (FLUSH) 0.9 %
5-40 SYRINGE (ML) INJECTION PRN
Status: DISCONTINUED | OUTPATIENT
Start: 2024-05-13 | End: 2024-05-15 | Stop reason: HOSPADM

## 2024-05-13 RX ORDER — MAGNESIUM CARB/ALUMINUM HYDROX 105-160MG
30 TABLET,CHEWABLE ORAL
Status: DISCONTINUED | OUTPATIENT
Start: 2024-05-13 | End: 2024-05-13

## 2024-05-13 RX ORDER — DOCUSATE SODIUM 100 MG/1
100 CAPSULE, LIQUID FILLED ORAL 2 TIMES DAILY
Status: DISCONTINUED | OUTPATIENT
Start: 2024-05-13 | End: 2024-05-15 | Stop reason: HOSPADM

## 2024-05-13 RX ORDER — OXYCODONE HYDROCHLORIDE 5 MG/1
5 TABLET ORAL EVERY 4 HOURS PRN
Status: DISCONTINUED | OUTPATIENT
Start: 2024-05-13 | End: 2024-05-15 | Stop reason: HOSPADM

## 2024-05-13 RX ORDER — LANOLIN
CREAM (ML) TOPICAL PRN
Status: DISCONTINUED | OUTPATIENT
Start: 2024-05-13 | End: 2024-05-15 | Stop reason: HOSPADM

## 2024-05-13 RX ORDER — SODIUM CHLORIDE 9 MG/ML
INJECTION, SOLUTION INTRAVENOUS PRN
Status: DISCONTINUED | OUTPATIENT
Start: 2024-05-13 | End: 2024-05-15 | Stop reason: HOSPADM

## 2024-05-13 RX ORDER — SODIUM CHLORIDE 0.9 % (FLUSH) 0.9 %
5-40 SYRINGE (ML) INJECTION EVERY 12 HOURS SCHEDULED
Status: DISCONTINUED | OUTPATIENT
Start: 2024-05-13 | End: 2024-05-15 | Stop reason: HOSPADM

## 2024-05-13 RX ADMIN — WITCH HAZEL: 500 SOLUTION RECTAL; TOPICAL at 04:28

## 2024-05-13 RX ADMIN — IBUPROFEN 600 MG: 600 TABLET, FILM COATED ORAL at 04:28

## 2024-05-13 RX ADMIN — Medication 60 ML: at 01:15

## 2024-05-13 RX ADMIN — ACETAMINOPHEN 1000 MG: 500 TABLET, FILM COATED ORAL at 16:21

## 2024-05-13 RX ADMIN — DOCUSATE SODIUM 100 MG: 100 CAPSULE, LIQUID FILLED ORAL at 11:53

## 2024-05-13 RX ADMIN — ONDANSETRON 4 MG: 2 INJECTION INTRAMUSCULAR; INTRAVENOUS at 00:32

## 2024-05-13 RX ADMIN — IBUPROFEN 600 MG: 600 TABLET, FILM COATED ORAL at 20:29

## 2024-05-13 RX ADMIN — IBUPROFEN 600 MG: 600 TABLET, FILM COATED ORAL at 11:53

## 2024-05-13 RX ADMIN — Medication: at 04:28

## 2024-05-13 RX ADMIN — OXYCODONE 5 MG: 5 TABLET ORAL at 03:45

## 2024-05-13 RX ADMIN — OXYTOCIN 909.1 MILLI-UNITS/MIN: 10 INJECTION INTRAVENOUS at 01:47

## 2024-05-13 ASSESSMENT — PAIN DESCRIPTION - LOCATION
LOCATION: BACK;ABDOMEN
LOCATION: ABDOMEN

## 2024-05-13 ASSESSMENT — PAIN DESCRIPTION - DESCRIPTORS
DESCRIPTORS: CRAMPING
DESCRIPTORS: ACHING;CRAMPING;DISCOMFORT

## 2024-05-13 ASSESSMENT — PAIN SCALES - GENERAL
PAINLEVEL_OUTOF10: 6
PAINLEVEL_OUTOF10: 3
PAINLEVEL_OUTOF10: 3

## 2024-05-13 ASSESSMENT — PAIN DESCRIPTION - ORIENTATION: ORIENTATION: ANTERIOR;LOWER

## 2024-05-13 NOTE — LACTATION NOTE
This note was copied from a baby's chart.  In to see mom and infant for the first time. Initiated pumping after instructing mom and dad how to use and clean the pump and breast pump kit. Used 21 mm flanges and mom pumped for 15 minutes. She expressed drops and dad took the flanges down to the nursey to see if he could feed them to infant with his clean finger. Instructed mom to pump a minimum of every 3 hours. Lactation consultant will follow up tomorrow.

## 2024-05-13 NOTE — L&D DELIVERY NOTE
Delivery of    weight 6-12    David Puga [[id number]]      Labor Events     Labor: No   Steroids: None  Cervical Ripening Date/Time:      Antibiotics Received during Labor: No  Rupture Identifier: Sac 1  Rupture Date/Time:  24 17:41:00   Rupture Type: AROM  Fluid Color: Clear  Fluid Odor: None  Fluid Volume: Moderate  Induction: None  Augmentation: Oxytocin  Labor Complications: None              Anesthesia    Method: Epidural       Delivery Details      Delivery Date: 24 Delivery Time: 01:43:00   Delivery Type: Vaginal, Spontaneous              [place] Presentation    Presentation: Vertex  Position: Left  _: Occiput  _: Anterior       Shoulder Dystocia    Shoulder Dystocia Present?: No       Assisted Delivery Details    Forceps Attempted?: No  Vacuum Extractor Attempted?: No                           Cord    Vessels: 3 Vessels  Complications: None  Delayed Cord Clamping?: Yes  Cord Clamped Date/Time: 2024 01:44:00  Cord Blood Disposition: Lab  Gases Sent?: Yes              Placenta    Date/Time: 2024 01:47:00  Removal: Expressed  Appearance: Intact  Disposition: Discarded       Lacerations    Episiotomy: None  Perineal Lacerations: 2nd  Other Lacerations: labial laceration  Labial Laceration: left Repaired?: Yes   Number of Repair Packets: 1       Vaginal Counts    Initial Count Personnel: CADEN CANO  Initial Count Verified By: TEJAL GOLD  Intial Sponge Count: Correct Intial Needles Count: Correct Intial Instruments Count: Correct   Final Count Personnel: CADEN CANO  Final Count Verified By: TEJAL GOLD       Blood Loss  Mother: Nancy Puga #[id number]     Start of Mother's Information      Delivery Blood Loss  24 1343 - 24 0204      Quantitative Blood Loss (mL) Hospital Encounter 300 grams    Total  300 mL               End of Mother's Information  Mother: Nancy Puga #[id number]                Delivery Providers    Delivering clinician: Jan Quarles

## 2024-05-13 NOTE — PROGRESS NOTES
Postpartum Progress Note (VD)    Patient: Nancy Puga MRN: [id number]  SSN: [id number]    YOB: 1995  Age: 28 y.o.  Sex: female      Subjective:     Postpartum Day: 0     Delivery: Spontaneous vaginal delivery    The patient feels well. The patient denies emotional concerns. Pain is  well controlled with current medications. Urinary output is adequate. Voiding spontaneous. The patient is ambulating well. The patient is tolerating a normal diet. Lochia is light.    The baby is in NICU, off CPAP now, but receiving antibiotics. Breastfeeding and pumping.    Objective:      Patient Vitals for the past 8 hrs:   BP Temp Temp src Pulse Resp SpO2   05/13/24 0415 106/64 97.9 °F (36.6 °C) Oral 96 16 98 %   05/13/24 0345 -- -- -- -- 20 --   05/13/24 0329 (!) 101/59 -- -- 100 -- --   05/13/24 0300 (!) 97/51 -- -- (!) 103 -- --   05/13/24 0230 (!) 137/54 -- -- (!) 107 -- --   05/13/24 0209 112/63 -- -- (!) 111 -- --   05/13/24 0129 -- -- -- (!) 130 -- --   05/12/24 2330 (!) 96/57 -- -- (!) 104 -- --     General:    alert, cooperative, no distress   Bowel Sounds:  active   Lochia:  appropriate   Uterine Fundus:   firm   Fundus Location:  -1   Perineum:  no significant drainage, no dehiscence, no significant erythema   DVT Evaluation:  No evidence of DVT seen on physical exam.     Lab/Data Review:      Assessment:     Status post: Doing well postpartum vaginal delivery     Plan:     - Postpartum care discussed including diet, ambulation, and actvitiy restrictions.  - Discharge planning for PPD #2      Fadumo Murphy MD

## 2024-05-13 NOTE — ANESTHESIA POSTPROCEDURE EVALUATION
Department of Anesthesiology  Postprocedure Note    Patient: Nancy Puga  MRN: [id number]  YOB: 1995  Date of evaluation: 5/13/2024    Procedure Summary       Date: 05/12/24 Room / Location:     Anesthesia Start: 1558 Anesthesia Stop: 05/13/24 0143    Procedure: Labor Analgesia Diagnosis:     Scheduled Providers:  Responsible Provider: CHINO Childress MD    Anesthesia Type: epidural ASA Status: 2            Anesthesia Type: No value filed.    Sunitha Phase I:      Sunitha Phase II:      Anesthesia Post Evaluation    Patient location during evaluation: bedside  Patient participation: complete - patient participated  Level of consciousness: awake and alert  Airway patency: patent  Nausea & Vomiting: no nausea and no vomiting  Cardiovascular status: hemodynamically stable  Respiratory status: acceptable  Hydration status: euvolemic  Comments: Blood pressure 106/64, pulse 96, temperature 97.9 °F (36.6 °C), temperature source Oral, resp. rate 16, SpO2 98 %, unknown if currently breastfeeding.    No apparent anesthetic complications.  No residual neuro deficits. Pt satisfied with epidural analgesia.  Multimodal analgesia pain management approach  Pain management: adequate    No notable events documented.

## 2024-05-14 PROCEDURE — 6370000000 HC RX 637 (ALT 250 FOR IP): Performed by: OBSTETRICS & GYNECOLOGY

## 2024-05-14 PROCEDURE — 1100000000 HC RM PRIVATE

## 2024-05-14 RX ADMIN — DOCUSATE SODIUM 100 MG: 100 CAPSULE, LIQUID FILLED ORAL at 21:19

## 2024-05-14 RX ADMIN — IBUPROFEN 600 MG: 600 TABLET, FILM COATED ORAL at 03:50

## 2024-05-14 RX ADMIN — DOCUSATE SODIUM 100 MG: 100 CAPSULE, LIQUID FILLED ORAL at 08:40

## 2024-05-14 RX ADMIN — IBUPROFEN 600 MG: 600 TABLET, FILM COATED ORAL at 17:31

## 2024-05-14 RX ADMIN — ACETAMINOPHEN 1000 MG: 500 TABLET, FILM COATED ORAL at 14:42

## 2024-05-14 RX ADMIN — ACETAMINOPHEN 1000 MG: 500 TABLET, FILM COATED ORAL at 02:43

## 2024-05-14 RX ADMIN — IBUPROFEN 600 MG: 600 TABLET, FILM COATED ORAL at 11:15

## 2024-05-14 RX ADMIN — ACETAMINOPHEN 1000 MG: 500 TABLET, FILM COATED ORAL at 08:40

## 2024-05-14 RX ADMIN — ACETAMINOPHEN 1000 MG: 500 TABLET, FILM COATED ORAL at 21:19

## 2024-05-14 RX ADMIN — OXYCODONE 5 MG: 5 TABLET ORAL at 04:15

## 2024-05-14 ASSESSMENT — PAIN DESCRIPTION - LOCATION
LOCATION: ABDOMEN;BACK;PERINEUM
LOCATION: ABDOMEN
LOCATION: ABDOMEN;BACK;PERINEUM
LOCATION: ABDOMEN

## 2024-05-14 ASSESSMENT — PAIN DESCRIPTION - ORIENTATION
ORIENTATION: ANTERIOR;LOWER
ORIENTATION: ANTERIOR;LOWER

## 2024-05-14 ASSESSMENT — PAIN DESCRIPTION - DESCRIPTORS
DESCRIPTORS: CRAMPING
DESCRIPTORS: CRAMPING

## 2024-05-14 ASSESSMENT — PAIN SCALES - GENERAL
PAINLEVEL_OUTOF10: 4
PAINLEVEL_OUTOF10: 5
PAINLEVEL_OUTOF10: 1
PAINLEVEL_OUTOF10: 3

## 2024-05-14 NOTE — PROGRESS NOTES
2015 Pt's  comes to desk to report pt is suddenly having blurry vision.  RN to room.  Pt sitting on the side of her bed, eating fruit, multiple family members present.  Pt reports she has had a sudden onset of blurry/double vision.  Reports she has to squint to see well.  Denies HA or epigastic pain.  Reports she has been ambulating without difficulty.  VS obtained.  Pt reports she had a similar episode around 0400 today but she ate and slept and it went away.  Reports eating well today but has only rested for a few hours.      2037-Telephone call to Dr Murphy, notified of pt's c/o, VS and assessment.  Orders received for stat CBC, encourage pt to rest and hydrate.    2041-Pt and family updated on plan for lab work.  Encouraged her to hydrate and rest.  Lights dimmed.  Family remains at bedside.

## 2024-05-14 NOTE — PROGRESS NOTES
Shift assessment complete see flowsheet. Discussed today plan of care. Pt denies vision changes this morning. Bleeding precautions given. Sitz supplies brought to room and pt educated on how to set up and use, pt voiced understanding. Pt to call with needs/concerns. Pt in bed with call light in reach. No s/s of distress noted at this time. Pt in bed with call light in reach.

## 2024-05-14 NOTE — PLAN OF CARE
Problem: Postpartum  Goal: Experiences normal postpartum course  Description:  Postpartum OB-Pregnancy care plan goal which identifies if the mother is experiencing a normal postpartum course  Outcome: Progressing  Goal: Appropriate maternal -  bonding  Description:  Postpartum OB-Pregnancy care plan goal which identifies if the mother and  are bonding appropriately  Outcome: Progressing  Goal: Establishment of infant feeding pattern  Description:  Postpartum OB-Pregnancy care plan goal which identifies if the mother is establishing a feeding pattern with their   Outcome: Progressing  Goal: Incisions, wounds, or drain sites healing without S/S of infection  Outcome: Progressing  Flowsheets (Taken 2024)  Incisions, Wounds, or Drain Sites Healing Without Sign and Symptoms of Infection:   ADMISSION and DAILY: Assess and document risk factors for pressure ulcer development   TWICE DAILY: Assess and document skin integrity     Problem: Pain  Goal: Verbalizes/displays adequate comfort level or baseline comfort level  Outcome: Progressing  Flowsheets (Taken 2024)  Verbalizes/displays adequate comfort level or baseline comfort level:   Encourage patient to monitor pain and request assistance   Assess pain using appropriate pain scale   Administer analgesics based on type and severity of pain and evaluate response   Implement non-pharmacological measures as appropriate and evaluate response   Consider cultural and social influences on pain and pain management   Notify Licensed Independent Practitioner if interventions unsuccessful or patient reports new pain     Problem: Infection - Adult  Goal: Absence of infection during hospitalization  Outcome: Progressing     Problem: Safety - Adult  Goal: Free from fall injury  Outcome: Progressing

## 2024-05-14 NOTE — PROGRESS NOTES
Pt reports her vision is improving but still not back to normal.  Pt visiting with multiple family members

## 2024-05-14 NOTE — PROGRESS NOTES
Postpartum Progress Note (VD)    Patient: Nancy Puga MRN: [id number]  SSN: [id number]    YOB: 1995  Age: 28 y.o.  Sex: female      Subjective:     Postpartum Day: 1     Delivery: Spontaneous  vaginal delivery    The patient feels well. The patient Patient was awake and alert. emotional concerns. Pain is  well controlled with current medications. Urinary output is adequate. Voiding spontaneous. The patient is ambulating well. The patient is tolerating a normal diet. Flatus has been passed.    The baby iswell in SCN, off o2 on room air, getting last dose of antibiotics at 11 am.  Maybe coming than to room. Baby is feeding via breast.    Objective:      Patient Vitals for the past 8 hrs:   BP Temp Temp src Pulse Resp SpO2   05/14/24 0727 (!) 92/58 97.2 °F (36.2 °C) Oral 66 14 99 %   05/14/24 0242 112/75 -- -- 76 16 100 %     General:    alert   Bowel Sounds:  active   Lochia:  appropriate   Uterine Fundus:   firm   Fundus Location:  -2   Perineum:  no significant drainage   DVT Evaluation:  No evidence of DVT seen on physical exam.     Lab/Data Review:   Latest Reference Range & Units 05/13/24 20:59   WBC 4.3 - 11.1 K/uL 12.8 (H)   RBC 4.05 - 5.2 M/uL 4.01 (L)   Hemoglobin Quant 11.7 - 15.4 g/dL 11.6 (L)   Hematocrit 35.8 - 46.3 % 35.5 (L)   MCV 82.0 - 102.0 FL 88.5   MCH 26.1 - 32.9 PG 28.9   MCHC 31.4 - 35.0 g/dL 32.7   MPV 9.4 - 12.3 FL 10.0   RDW 11.9 - 14.6 % 15.4 (H)   Platelet Count 150 - 450 K/uL 204   Nucleated Red Blood Cells 0.0 - 0.2 K/uL 0.00   (H): Data is abnormally high  (L): Data is abnormally low    Assessment:     Status post: Doing well postpartum vaginal delivery     Plan:     - Postpartum care discussed including diet, ambulation, and actvitiy restrictions.  - c/o vision changes last night, better now.    Pubic cramping, relieved with Tylenol and Motrin, discussed uterus cramping and decreasing in size.  Exam okay.  If remains, would get urine C&S.   - Discharge planning for

## 2024-05-14 NOTE — PROGRESS NOTES
Postpartum Progress Note (VD)    Patient: Nancy Puga MRN: [id number]  SSN: [id number]    YOB: 1995  Age: 28 y.o.  Sex: female      Subjective:     Postpartum Day: 1     Delivery: Spontaneous vaginal delivery    The patient feels well. The patient denies emotional concerns. Pain is well controlled with current medications. Urinary output is adequate. Voiding spontaneous. The patient is ambulating well. The patient is tolerating a normal diet. Lochia is light.    Yesterday evening she felt her vision was blurred. Vital signs normal. CBC unremarkable. Hydration and rest encouraged. She notes symptoms better this AM.     She also had lower abdominal cramping and pain last night that lasted 2 hours. Motrin and Tylenol did not control pain, but Oxycodone helped. Pain better today.     She c/o coccyx pain with sitting and standing.    The baby is in NICU, off CPAP now, but receiving antibiotics for 36 hrs per mother. Breastfeeding and pumping.    Objective:      Patient Vitals for the past 8 hrs:   BP Temp Temp src Pulse Resp SpO2   05/14/24 0727 (!) 92/58 97.2 °F (36.2 °C) Oral 66 14 99 %   05/14/24 0242 112/75 -- -- 76 16 100 %       General:    alert, cooperative, no distress   Bowel Sounds:  active   Lochia:  appropriate   Uterine Fundus:   firm   Fundus Location:  -1   Perineum:  no significant drainage, no dehiscence, no significant erythema   DVT Evaluation:  No evidence of DVT seen on physical exam.     Lab/Data Review:  Pp Hgb 11.6    Assessment:     Status post: Doing well postpartum vaginal delivery     Plan:     - Postpartum care discussed including diet, ambulation, and actvitiy restrictions.  - Acute blood loss anemia: Hgb 11.6 pp, lochia appropriate. Asymptomatic. Dietary iron encouraged.  - Cramping/abd pain: Fundus firm and nontender. Suspect cramps of uterus shrinking and possibly gas. Mom and pumped shortly before symptoms began. Advised motrin/tylenol as ordered, PRN oxycodone

## 2024-05-15 VITALS
DIASTOLIC BLOOD PRESSURE: 73 MMHG | HEART RATE: 71 BPM | OXYGEN SATURATION: 98 % | TEMPERATURE: 97.5 F | SYSTOLIC BLOOD PRESSURE: 104 MMHG | RESPIRATION RATE: 14 BRPM

## 2024-05-15 PROCEDURE — 6370000000 HC RX 637 (ALT 250 FOR IP): Performed by: OBSTETRICS & GYNECOLOGY

## 2024-05-15 RX ORDER — IBUPROFEN 600 MG/1
600 TABLET ORAL EVERY 6 HOURS PRN
Qty: 40 TABLET | Refills: 0 | Status: SHIPPED | OUTPATIENT
Start: 2024-05-15 | End: 2024-05-25

## 2024-05-15 RX ORDER — OXYCODONE HYDROCHLORIDE 5 MG/1
5 TABLET ORAL EVERY 6 HOURS PRN
Qty: 4 TABLET | Refills: 0 | Status: SHIPPED | OUTPATIENT
Start: 2024-05-15 | End: 2024-05-17

## 2024-05-15 RX ADMIN — ACETAMINOPHEN 1000 MG: 500 TABLET, FILM COATED ORAL at 08:26

## 2024-05-15 RX ADMIN — IBUPROFEN 600 MG: 600 TABLET, FILM COATED ORAL at 02:43

## 2024-05-15 RX ADMIN — WITCH HAZEL: 500 SOLUTION RECTAL; TOPICAL at 10:04

## 2024-05-15 RX ADMIN — DOCUSATE SODIUM 100 MG: 100 CAPSULE, LIQUID FILLED ORAL at 08:26

## 2024-05-15 ASSESSMENT — PAIN DESCRIPTION - LOCATION: LOCATION: ABDOMEN;BACK

## 2024-05-15 ASSESSMENT — PAIN SCALES - GENERAL: PAINLEVEL_OUTOF10: 5

## 2024-05-15 NOTE — LACTATION NOTE
This note was copied from a baby's chart.  In to see mom and infant prior to discharge to home. Mom stated that she has been attempting to latch infant and then formula feeding infant. She is sporadically pumping.  Did inform her that she needs to pump a minimum of  8 times in 24 hours to stimulate her breast to male the supply needed to provide to infant. Demonstrated to her how to use her pump. Also measured her nipples. Both measured at 18 mm. She plans to purchase appropriate size flange. Reviewed discharge information and answered questions. Mom and infant are following up with [facility] and will see lactation consultant there.

## 2024-05-15 NOTE — DISCHARGE INSTRUCTIONS
Pelvic rest for 6wk  NO driving for 2wk or while taking pain medications  NO tub baths, pools, or hot tubs for 6wk.  NO push/pull motion such as vacuuming or sweeping for 2wk  NO lifting >10lb for 2wk.       After Your Delivery (the Postpartum Period): Care Instructions  Overview     After childbirth (postpartum period), your body goes through many changes as you recover. In these weeks after delivery, try to take good care of yourself. Get rest whenever you can and accept help from others.  It may take 4 to 6 weeks to feel like yourself again, and possibly longer if you had a  birth. You may feel sore or very tired as you recover. After delivery, you may continue to have contractions as the uterus returns to the size it was before your pregnancy. You will also have some vaginal bleeding. And you may have pain around the vagina as you heal. Several days after delivery you may also have pain and swelling in your breasts as they fill with milk. There are things you can do at home to help ease these discomforts.  After childbirth, it's common to feel emotional. You may feel irritable, cry easily, and feel happy one minute and sad the next. This is called the \"baby blues.\" Hormone changes are one cause of these emotional changes. These feelings usually get better within a couple of weeks. If they don't, talk to your doctor or midwife.  In the first couple of weeks after you give birth, your doctor or midwife may want to check in with you and make a plan for follow-up care. You will likely have a complete postpartum visit in the first 3 months after delivery. At that time, your doctor or midwife will check on your recovery and see how you're doing. But if you have questions or concerns before then, you can always call your doctor or midwife.  Follow-up care is a key part of your treatment and safety. Be sure to make and go to all appointments, and call your doctor if you are having problems. It's also a good idea

## 2024-05-15 NOTE — PROGRESS NOTES
05/15/24 1513   AVS Reviewed   AVS & discharge instructions reviewed with patient and/or representative? Yes   Reviewed instructions with Patient   Level of Understanding Questions answered     Escorted to car by [facility] staff.

## 2024-05-15 NOTE — DISCHARGE SUMMARY
Obstetrical Discharge Summary     Name: Nancy Puga MRN: [id number]  SSN: [id number]    YOB: 1995  Age: 28 y.o.  Sex: female      Allergies: Patient has no known allergies.    Admit Date: 2024    Discharge Date: 5/15/2024     Admitting Physician: Anastacio Salvador MD     Attending Physician:  Jan Quarles MD     * Admission Diagnoses: Normal labor [O80, Z37.9]    * Discharge Diagnoses:   Information for the patient's :  David Puga [[id number]]   @[id number]@      Additional Diagnoses:    Lab Results   Component Value Date/Time    ABORH A POSITIVE 2024 03:55 PM    There is no immunization history for the selected administration types on file for this patient.    * Procedures:   * No surgery found *           * Discharge Condition: Good    * Hospital Course: Normal hospital course following the delivery. Meeting all goals of discharge on PPD 2. Discharged home with routine precautions. Follow-up 6 weeks for postpartum visit.     * Disposition: Home    Discharge Medications:      Medication List        START taking these medications      ibuprofen 600 MG tablet  Commonly known as: ADVIL;MOTRIN  Take 1 tablet by mouth every 6 hours as needed for Pain     oxyCODONE 5 MG immediate release tablet  Commonly known as: ROXICODONE  Take 1 tablet by mouth every 6 hours as needed for Pain for up to 2 days. Max Daily Amount: 20 mg            CONTINUE taking these medications      albuterol 0.63 MG/3ML nebulizer solution  Commonly known as: ACCUNEB     PRENATAL 1 PO            STOP taking these medications      cyclobenzaprine 10 MG tablet  Commonly known as: FLEXERIL               Where to Get Your Medications        These medications were sent to [id number] - [place] - [address] - P [phone] - F [phone]  [address] [place] [address]      Phone: [phone]   ibuprofen 600 MG tablet  oxyCODONE 5 MG immediate release tablet

## 2024-05-15 NOTE — CARE COORDINATION
Chart reviewed - first time parent.  SW met with patient to complete initial assessment.     provided education on [facility] Postpartum Normal Home Visit.  Family would like to participate in program.  Referral made to [facility]  home visit program.    Patient given informational packet on  mood & anxiety disorders (resources/education).    Family denies any additional needs from  at this time.  Family has 's contact information should any needs/questions arise.    Veronica Powell, CHAD-CP, PMH-C  [facility]   [phone]